# Patient Record
Sex: FEMALE | Race: WHITE | NOT HISPANIC OR LATINO | Employment: OTHER | URBAN - METROPOLITAN AREA
[De-identification: names, ages, dates, MRNs, and addresses within clinical notes are randomized per-mention and may not be internally consistent; named-entity substitution may affect disease eponyms.]

---

## 2019-11-01 ENCOUNTER — NEW PATIENT COMPREHENSIVE (OUTPATIENT)
Dept: URBAN - METROPOLITAN AREA CLINIC 32 | Facility: CLINIC | Age: 69
End: 2019-11-01

## 2019-11-01 DIAGNOSIS — H40.1333: ICD-10-CM

## 2019-11-01 PROCEDURE — 92004 COMPRE OPH EXAM NEW PT 1/>: CPT

## 2019-11-01 PROCEDURE — 92133 CPTRZD OPH DX IMG PST SGM ON: CPT

## 2019-11-01 ASSESSMENT — TONOMETRY
OD_IOP_MMHG: 14
OS_IOP_MMHG: 11

## 2019-11-01 ASSESSMENT — VISUAL ACUITY
OS_CC: 20/40
OS_SC: J2
OD_CC: 20/80
OD_SC: J7

## 2020-01-08 ENCOUNTER — IOP CHECK (OUTPATIENT)
Dept: URBAN - METROPOLITAN AREA CLINIC 32 | Facility: CLINIC | Age: 70
End: 2020-01-08

## 2020-01-08 DIAGNOSIS — H40.1333: ICD-10-CM

## 2020-01-08 DIAGNOSIS — H35.371: ICD-10-CM

## 2020-01-08 PROCEDURE — 92134 CPTRZ OPH DX IMG PST SGM RTA: CPT

## 2020-01-08 PROCEDURE — 99213 OFFICE O/P EST LOW 20 MIN: CPT

## 2020-01-08 PROCEDURE — 92083 EXTENDED VISUAL FIELD XM: CPT

## 2020-01-08 RX ORDER — DORZOLAMIDE HYDROCHLORIDE AND TIMOLOL MALEATE 20; 5 MG/ML; MG/ML: 1 SOLUTION/ DROPS OPHTHALMIC TWICE A DAY

## 2020-01-08 ASSESSMENT — PACHYMETRY
OS_CT_UM: 600
OD_CT_UM: 605

## 2020-01-08 ASSESSMENT — VISUAL ACUITY
OS_CC: 20/60+1
OD_CC: 20/200+1

## 2020-01-08 ASSESSMENT — TONOMETRY
OD_IOP_MMHG: 10
OS_IOP_MMHG: 10

## 2020-08-06 ENCOUNTER — IOP CHECK (OUTPATIENT)
Dept: URBAN - METROPOLITAN AREA CLINIC 32 | Facility: CLINIC | Age: 70
End: 2020-08-06

## 2020-08-06 DIAGNOSIS — H40.1333: ICD-10-CM

## 2020-08-06 PROCEDURE — 99213 OFFICE O/P EST LOW 20 MIN: CPT

## 2020-08-06 PROCEDURE — 92133 CPTRZD OPH DX IMG PST SGM ON: CPT

## 2020-08-06 ASSESSMENT — TONOMETRY
OS_IOP_MMHG: 10
OD_IOP_MMHG: 10

## 2020-08-06 ASSESSMENT — VISUAL ACUITY
OD_CC: 20/200
OS_CC: 20/60

## 2021-05-12 ENCOUNTER — IOP CHECK (OUTPATIENT)
Dept: URBAN - METROPOLITAN AREA CLINIC 32 | Facility: CLINIC | Age: 71
End: 2021-05-12

## 2021-05-12 DIAGNOSIS — H40.1333: ICD-10-CM

## 2021-05-12 PROCEDURE — 92083 EXTENDED VISUAL FIELD XM: CPT

## 2021-05-12 PROCEDURE — 99213 OFFICE O/P EST LOW 20 MIN: CPT

## 2021-05-12 ASSESSMENT — VISUAL ACUITY
OS_CC: 20/40-1
OD_CC: 20/100

## 2021-05-12 ASSESSMENT — TONOMETRY
OD_IOP_MMHG: 11
OS_IOP_MMHG: 11

## 2021-07-09 ENCOUNTER — ESTABLISHED COMPREHENSIVE EXAM (OUTPATIENT)
Dept: URBAN - METROPOLITAN AREA CLINIC 32 | Facility: CLINIC | Age: 71
End: 2021-07-09

## 2021-07-09 DIAGNOSIS — H53.2: ICD-10-CM

## 2021-07-09 DIAGNOSIS — H04.123: ICD-10-CM

## 2021-07-09 DIAGNOSIS — Z96.1: ICD-10-CM

## 2021-07-09 DIAGNOSIS — H40.1333: ICD-10-CM

## 2021-07-09 DIAGNOSIS — H35.371: ICD-10-CM

## 2021-07-09 PROCEDURE — 92081 LIMITED VISUAL FIELD XM: CPT

## 2021-07-09 PROCEDURE — 92015 DETERMINE REFRACTIVE STATE: CPT

## 2021-07-09 PROCEDURE — 92014 COMPRE OPH EXAM EST PT 1/>: CPT

## 2021-07-09 PROCEDURE — 92133 CPTRZD OPH DX IMG PST SGM ON: CPT

## 2021-07-09 ASSESSMENT — VISUAL ACUITY
OD_CC: 20/200
OS_CC: 20/60+1
OS_CC: J1
OD_CC: J16

## 2021-07-09 ASSESSMENT — TONOMETRY
OD_IOP_MMHG: 12
OS_IOP_MMHG: 9

## 2021-07-09 ASSESSMENT — KERATOMETRY
OS_K2POWER_DIOPTERS: 44.25
OD_K2POWER_DIOPTERS: 45.50
OD_AXISANGLE_DEGREES: 103
OS_AXISANGLE_DEGREES: 109
OD_AXISANGLE2_DEGREES: 13
OD_K1POWER_DIOPTERS: 44
OS_K1POWER_DIOPTERS: 45.25
OS_AXISANGLE2_DEGREES: 19

## 2021-08-06 ENCOUNTER — IOP CHECK (OUTPATIENT)
Dept: URBAN - METROPOLITAN AREA CLINIC 32 | Facility: CLINIC | Age: 71
End: 2021-08-06

## 2021-08-06 DIAGNOSIS — H40.1333: ICD-10-CM

## 2021-08-06 DIAGNOSIS — H04.123: ICD-10-CM

## 2021-08-06 PROCEDURE — 99213 OFFICE O/P EST LOW 20 MIN: CPT

## 2021-08-06 PROCEDURE — 92083 EXTENDED VISUAL FIELD XM: CPT

## 2021-08-06 ASSESSMENT — KERATOMETRY
OD_K1POWER_DIOPTERS: 44
OS_AXISANGLE_DEGREES: 109
OD_AXISANGLE2_DEGREES: 13
OD_AXISANGLE_DEGREES: 103
OS_AXISANGLE2_DEGREES: 19
OD_K2POWER_DIOPTERS: 45.50
OS_K2POWER_DIOPTERS: 44.25
OS_K1POWER_DIOPTERS: 45.25

## 2021-08-06 ASSESSMENT — TONOMETRY
OD_IOP_MMHG: 12
OS_IOP_MMHG: 10

## 2021-08-06 ASSESSMENT — VISUAL ACUITY
OS_SC: 20/200
OD_SC: 20/400
OS_CC: 20/50+1
OD_CC: 20/100

## 2021-10-20 NOTE — PATIENT DISCUSSION
The patient was informed that with [address] [facility] for distance, they will need glasses for all near and intermediate activities after surgery. The patient understands there is a possibility they may need an enhancement after surgery. The patient elects Custom Vision OD, goal of emmetropia.

## 2021-10-20 NOTE — PATIENT DISCUSSION
****11/3/21 Pt has reconsidered goal for surgery. CL trial OS at 1 day PO to simulate monovision with SHON. If pt appreciates CL trial, proceed with Custom OS, goal of -2.00. If pt does not appreciate CL trial, proceed with Custom OS, goal of emmetropia OS. Vearl Saint****.

## 2021-10-20 NOTE — PATIENT DISCUSSION
Discussed all lens options. Pt is a candidate for all lens options. Pt well educated on halos with the Synergy lens. Pt educated there is no guarantee halos will go away. Although she does not see halos now, pt understands she may see halos with a Synergy lens.

## 2021-11-30 NOTE — PATIENT DISCUSSION
The patient was informed that with [address] [facility] for distance, they will need glasses for all near and intermediate activities after surgery. The patient understands there is a possibility they may need an enhancement after surgery. The patient elects Custom Vision OD, goal of emmetropia.

## 2021-11-30 NOTE — PATIENT DISCUSSION
****11/3/21 Pt has reconsidered goal for surgery. CL trial OS at 1 day PO to simulate monovision with SHON. If pt appreciates CL trial, proceed with Custom OS, goal of -2.00. If pt does not appreciate CL trial, proceed with Custom OS, goal of emmetropia OS. Sisto Both****.

## 2021-12-07 NOTE — PATIENT DISCUSSION
****11/3/21 Pt has reconsidered goal for surgery. CL trial OS at 1 day PO to simulate monovision with SHON. If pt appreciates CL trial, proceed with Custom OS, goal of -2.00. If pt does not appreciate CL trial, proceed with Custom OS, goal of emmetropia OS. Helotes Rule****.

## 2021-12-07 NOTE — PATIENT DISCUSSION
The patient was informed that with [address] [facility] for distance, they will need glasses for all near and intermediate activities after surgery. The patient understands there is a possibility they may need an enhancement after surgery. The patient elects Custom Vision OD, goal of emmetropia.

## 2021-12-07 NOTE — PATIENT DISCUSSION
****11/3/21 Pt has reconsidered goal for surgery. CL trial OS at 1 day PO to simulate monovision with SHON. If pt appreciates CL trial, proceed with Custom OS, goal of -2.00. If pt does not appreciate CL trial, proceed with Custom OS, goal of emmetropia OS. Diegois Sham****.

## 2021-12-08 NOTE — PATIENT DISCUSSION
Cataract surgery has been performed in the first eye and activities of daily living are still impaired. The patient would like to proceed with cataract surgery in the second eye as scheduled. The patient elects CV OS,  goal to be determined.

## 2021-12-10 NOTE — PATIENT DISCUSSION
Cataract surgery has been performed in the first eye and activities of daily living are still impaired. The patient would like to proceed with cataract surgery in the second eye as scheduled. The patient elects CV Monovision, OS,  goal -2.00.

## 2022-01-13 ENCOUNTER — IOP CHECK (OUTPATIENT)
Dept: URBAN - METROPOLITAN AREA CLINIC 32 | Facility: CLINIC | Age: 72
End: 2022-01-13

## 2022-01-13 DIAGNOSIS — H40.1333: ICD-10-CM

## 2022-01-13 PROCEDURE — 92083 EXTENDED VISUAL FIELD XM: CPT

## 2022-01-13 PROCEDURE — 99213 OFFICE O/P EST LOW 20 MIN: CPT

## 2022-01-13 ASSESSMENT — TONOMETRY
OS_IOP_MMHG: 12
OD_IOP_MMHG: 12

## 2022-01-13 ASSESSMENT — KERATOMETRY
OD_K2POWER_DIOPTERS: 45.50
OS_K2POWER_DIOPTERS: 44.25
OD_AXISANGLE2_DEGREES: 13
OS_AXISANGLE2_DEGREES: 19
OS_K1POWER_DIOPTERS: 45.25
OD_K1POWER_DIOPTERS: 44
OD_AXISANGLE_DEGREES: 103
OS_AXISANGLE_DEGREES: 109

## 2022-01-13 ASSESSMENT — VISUAL ACUITY
OD_CC: 20/100
OS_CC: 20/60

## 2022-06-23 ENCOUNTER — COMPREHENSIVE EXAM (OUTPATIENT)
Dept: URBAN - METROPOLITAN AREA CLINIC 32 | Facility: CLINIC | Age: 72
End: 2022-06-23

## 2022-06-23 DIAGNOSIS — H35.371: ICD-10-CM

## 2022-06-23 DIAGNOSIS — H53.2: ICD-10-CM

## 2022-06-23 DIAGNOSIS — Z96.1: ICD-10-CM

## 2022-06-23 DIAGNOSIS — H40.1333: ICD-10-CM

## 2022-06-23 DIAGNOSIS — H04.123: ICD-10-CM

## 2022-06-23 PROCEDURE — 99214 OFFICE O/P EST MOD 30 MIN: CPT

## 2022-06-23 PROCEDURE — 92133 CPTRZD OPH DX IMG PST SGM ON: CPT

## 2022-06-23 ASSESSMENT — VISUAL ACUITY
OD_CC: 20/100
OS_CC: 20/50-1
OD_SC: 20/400
OS_SC: J1-2
OD_CC: J2
OS_SC: 20/100
OD_SC: J2+3
OS_CC: J1+

## 2022-06-23 ASSESSMENT — KERATOMETRY
OS_AXISANGLE2_DEGREES: 10
OD_K1POWER_DIOPTERS: 45.75
OD_AXISANGLE2_DEGREES: 95
OD_K2POWER_DIOPTERS: 44.00
OS_K1POWER_DIOPTERS: 45.50
OS_K2POWER_DIOPTERS: 44.50
OS_AXISANGLE_DEGREES: 100
OD_AXISANGLE_DEGREES: 5

## 2022-06-23 ASSESSMENT — TONOMETRY
OD_IOP_MMHG: 11
OS_IOP_MMHG: 10

## 2022-09-10 NOTE — PATIENT DISCUSSION
Instructed to call immediately if any new distortion, blurring, decreased vision or eye pain. established a supportive relationship with patient/family/emotional support was provided/therapeutic activity was provided/developmental stimulation/support was provided/explanation of hospital routines was provided/psychological preparation for sedated procedure/scan was provided/caregiver education was provided/engaged in redirection/distraction during medical procedure/emotional support during medical procedure was provided/engaged in immediate post-procedural support/medical play was provided for familiarization of medical materials